# Patient Record
(demographics unavailable — no encounter records)

---

## 2025-04-30 NOTE — HISTORY OF PRESENT ILLNESS
[FreeTextEntry1] : work physical [de-identified] : no significant pmhx fam hx dm2 mother, bicuspid aortic valve brother social hx only positive for sparse social drinking no maintenance meds, rarely using prn otc allergy pill requesting sti panel, but denies any new worrisome encounters

## 2025-04-30 NOTE — PHYSICAL EXAM
[No Acute Distress] : no acute distress [No Lymphadenopathy] : no lymphadenopathy [Thyroid Normal, No Nodules] : the thyroid was normal and there were no nodules present [No Accessory Muscle Use] : no accessory muscle use [Clear to Auscultation] : lungs were clear to auscultation bilaterally [Regular Rhythm] : with a regular rhythm [Normal S1, S2] : normal S1 and S2 [Soft] : abdomen soft [Non Tender] : non-tender [Non-distended] : non-distended [No Rash] : no rash [No Focal Deficits] : no focal deficits [Normal Insight/Judgement] : insight and judgment were intact

## 2025-04-30 NOTE — ASSESSMENT
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